# Patient Record
(demographics unavailable — no encounter records)

---

## 2024-10-14 NOTE — ASSESSMENT
[FreeTextEntry1] : Purvi is a 15yo with hx of ADHD. Compliant with medication without side effects  Plan to: - Increase methylphenidate from 18mg to 27mg to better manage symptoms. - Follow up in 3 months.

## 2024-10-14 NOTE — HISTORY OF PRESENT ILLNESS
[FreeTextEntry1] : Purvi presents in follow up of her ADHD. In May, she was started on methylphenidate which helped manage her symptoms slightly. She felt a little more focused in school. She denies side effects. She is now a leslie. She is an excellent student with scores in the high 90s. She is having difficulty with time management. She procrastinates and does her homework late at night.

## 2025-01-17 NOTE — PHYSICAL EXAM
[Well-appearing] : well-appearing [Alert] : alert [Well related, good eye contact] : well related, good eye contact [Follows instructions well] : follows instructions well [Gross hearing intact] : gross hearing intact [Good shoulder shrug] : good shoulder shrug [Normal tongue movement] : normal tongue movement [No pronator drift] : no pronator drift

## 2025-01-17 NOTE — ASSESSMENT
[FreeTextEntry1] : Purvi is a 17yo with hx of ADHD. No dose adjustments needed at this time.  Plan to: - Continue methylphenidate 27mg - Follow up in 3 months.

## 2025-01-17 NOTE — HISTORY OF PRESENT ILLNESS
[FreeTextEntry1] : Purvi presents in follow up of her ADHD. She is compliant with medication and denies any side effects.